# Patient Record
Sex: MALE | Race: WHITE | NOT HISPANIC OR LATINO | Employment: OTHER | URBAN - METROPOLITAN AREA
[De-identification: names, ages, dates, MRNs, and addresses within clinical notes are randomized per-mention and may not be internally consistent; named-entity substitution may affect disease eponyms.]

---

## 2023-04-19 ENCOUNTER — HOSPITAL ENCOUNTER (EMERGENCY)
Facility: CLINIC | Age: 73
Discharge: HOME | End: 2023-04-19
Attending: EMERGENCY MEDICINE | Admitting: EMERGENCY MEDICINE
Payer: MEDICARE

## 2023-04-19 ENCOUNTER — APPOINTMENT (OUTPATIENT)
Dept: MRI IMAGING | Facility: CLINIC | Age: 73
End: 2023-04-19
Attending: EMERGENCY MEDICINE
Payer: MEDICARE

## 2023-04-19 VITALS
DIASTOLIC BLOOD PRESSURE: 81 MMHG | RESPIRATION RATE: 16 BRPM | TEMPERATURE: 98.5 F | OXYGEN SATURATION: 97 % | HEART RATE: 82 BPM | SYSTOLIC BLOOD PRESSURE: 127 MMHG

## 2023-04-19 DIAGNOSIS — M54.50 ACUTE RIGHT-SIDED LOW BACK PAIN WITHOUT SCIATICA: ICD-10-CM

## 2023-04-19 LAB
ALBUMIN UR-MCNC: NEGATIVE MG/DL
ANION GAP SERPL CALCULATED.3IONS-SCNC: 7 MMOL/L (ref 7–15)
APPEARANCE UR: CLEAR
BASOPHILS # BLD AUTO: 0 10E3/UL (ref 0–0.2)
BASOPHILS NFR BLD AUTO: 0 %
BILIRUB UR QL STRIP: NEGATIVE
BUN SERPL-MCNC: 15.1 MG/DL (ref 8–23)
CALCIUM SERPL-MCNC: 8.7 MG/DL (ref 8.8–10.2)
CHLORIDE SERPL-SCNC: 103 MMOL/L (ref 98–107)
COLOR UR AUTO: ABNORMAL
CREAT SERPL-MCNC: 0.89 MG/DL (ref 0.67–1.17)
DEPRECATED HCO3 PLAS-SCNC: 29 MMOL/L (ref 22–29)
EOSINOPHIL # BLD AUTO: 0 10E3/UL (ref 0–0.7)
EOSINOPHIL NFR BLD AUTO: 0 %
ERYTHROCYTE [DISTWIDTH] IN BLOOD BY AUTOMATED COUNT: 13.4 % (ref 10–15)
GFR SERPL CREATININE-BSD FRML MDRD: >90 ML/MIN/1.73M2
GLUCOSE SERPL-MCNC: 111 MG/DL (ref 70–99)
GLUCOSE UR STRIP-MCNC: NEGATIVE MG/DL
HCT VFR BLD AUTO: 39.8 % (ref 40–53)
HGB BLD-MCNC: 13.8 G/DL (ref 13.3–17.7)
HGB UR QL STRIP: NEGATIVE
HOLD SPECIMEN: NORMAL
HOLD SPECIMEN: NORMAL
IMM GRANULOCYTES # BLD: 0 10E3/UL
IMM GRANULOCYTES NFR BLD: 0 %
KETONES UR STRIP-MCNC: NEGATIVE MG/DL
LEUKOCYTE ESTERASE UR QL STRIP: NEGATIVE
LYMPHOCYTES # BLD AUTO: 1.1 10E3/UL (ref 0.8–5.3)
LYMPHOCYTES NFR BLD AUTO: 23 %
MCH RBC QN AUTO: 35.6 PG (ref 26.5–33)
MCHC RBC AUTO-ENTMCNC: 34.7 G/DL (ref 31.5–36.5)
MCV RBC AUTO: 103 FL (ref 78–100)
MONOCYTES # BLD AUTO: 0.3 10E3/UL (ref 0–1.3)
MONOCYTES NFR BLD AUTO: 7 %
MUCOUS THREADS #/AREA URNS LPF: PRESENT /LPF
NEUTROPHILS # BLD AUTO: 3.4 10E3/UL (ref 1.6–8.3)
NEUTROPHILS NFR BLD AUTO: 70 %
NITRATE UR QL: NEGATIVE
NRBC # BLD AUTO: 0 10E3/UL
NRBC BLD AUTO-RTO: 0 /100
PH UR STRIP: 7.5 [PH] (ref 5–7)
PLATELET # BLD AUTO: 225 10E3/UL (ref 150–450)
POTASSIUM SERPL-SCNC: 4.6 MMOL/L (ref 3.4–5.3)
RBC # BLD AUTO: 3.88 10E6/UL (ref 4.4–5.9)
RBC URINE: 1 /HPF
SODIUM SERPL-SCNC: 139 MMOL/L (ref 136–145)
SP GR UR STRIP: 1.01 (ref 1–1.03)
UROBILINOGEN UR STRIP-MCNC: NORMAL MG/DL
WBC # BLD AUTO: 4.9 10E3/UL (ref 4–11)
WBC URINE: 0 /HPF

## 2023-04-19 PROCEDURE — 36415 COLL VENOUS BLD VENIPUNCTURE: CPT | Performed by: EMERGENCY MEDICINE

## 2023-04-19 PROCEDURE — 85025 COMPLETE CBC W/AUTO DIFF WBC: CPT | Performed by: EMERGENCY MEDICINE

## 2023-04-19 PROCEDURE — 250N000011 HC RX IP 250 OP 636: Performed by: EMERGENCY MEDICINE

## 2023-04-19 PROCEDURE — 80048 BASIC METABOLIC PNL TOTAL CA: CPT | Performed by: EMERGENCY MEDICINE

## 2023-04-19 PROCEDURE — 99285 EMERGENCY DEPT VISIT HI MDM: CPT | Mod: 25

## 2023-04-19 PROCEDURE — 96374 THER/PROPH/DIAG INJ IV PUSH: CPT

## 2023-04-19 PROCEDURE — G1010 CDSM STANSON: HCPCS

## 2023-04-19 PROCEDURE — 81001 URINALYSIS AUTO W/SCOPE: CPT | Performed by: EMERGENCY MEDICINE

## 2023-04-19 PROCEDURE — 250N000013 HC RX MED GY IP 250 OP 250 PS 637: Performed by: EMERGENCY MEDICINE

## 2023-04-19 RX ORDER — HYDROCODONE BITARTRATE AND ACETAMINOPHEN 5; 325 MG/1; MG/1
2 TABLET ORAL ONCE
Status: COMPLETED | OUTPATIENT
Start: 2023-04-19 | End: 2023-04-19

## 2023-04-19 RX ORDER — KETOROLAC TROMETHAMINE 15 MG/ML
10 INJECTION, SOLUTION INTRAMUSCULAR; INTRAVENOUS ONCE
Status: COMPLETED | OUTPATIENT
Start: 2023-04-19 | End: 2023-04-19

## 2023-04-19 RX ORDER — LIDOCAINE 50 MG/G
1 PATCH TOPICAL EVERY 24 HOURS
Qty: 10 PATCH | Refills: 0 | Status: SHIPPED | OUTPATIENT
Start: 2023-04-19 | End: 2023-04-29

## 2023-04-19 RX ORDER — CYCLOBENZAPRINE HCL 10 MG
10 TABLET ORAL 3 TIMES DAILY PRN
Qty: 20 TABLET | Refills: 0 | Status: SHIPPED | OUTPATIENT
Start: 2023-04-19

## 2023-04-19 RX ORDER — HYDROCODONE BITARTRATE AND ACETAMINOPHEN 5; 325 MG/1; MG/1
1 TABLET ORAL EVERY 6 HOURS PRN
Qty: 30 TABLET | Refills: 0 | Status: SHIPPED | OUTPATIENT
Start: 2023-04-19

## 2023-04-19 RX ADMIN — HYDROCODONE BITARTRATE AND ACETAMINOPHEN 2 TABLET: 5; 325 TABLET ORAL at 12:25

## 2023-04-19 RX ADMIN — KETOROLAC TROMETHAMINE 10 MG: 15 INJECTION, SOLUTION INTRAMUSCULAR; INTRAVENOUS at 14:38

## 2023-04-19 ASSESSMENT — ACTIVITIES OF DAILY LIVING (ADL)
ADLS_ACUITY_SCORE: 37

## 2023-04-19 ASSESSMENT — ENCOUNTER SYMPTOMS
FREQUENCY: 1
WEAKNESS: 0
SHORTNESS OF BREATH: 0
FEVER: 0
ABDOMINAL PAIN: 0
BACK PAIN: 1
NUMBNESS: 0
NAUSEA: 0
CHILLS: 0

## 2023-04-19 NOTE — ED TRIAGE NOTES
Patient presents with lower back pain that started yesterday after fishing, denies fall or trauma.  Patient is pain free when laying on his back but cannot get up without severe pain.  Pain has gotten progressively worse since yesterday.  No radiation, numbness or tingling.     Triage Assessment     Row Name 04/19/23 1123       Triage Assessment (Adult)    Airway WDL WDL       Respiratory WDL    Respiratory WDL WDL       Skin Circulation/Temperature WDL    Skin Circulation/Temperature WDL WDL       Cardiac WDL    Cardiac WDL WDL       Peripheral/Neurovascular WDL    Peripheral Neurovascular WDL WDL       Cognitive/Neuro/Behavioral WDL    Cognitive/Neuro/Behavioral WDL WDL

## 2023-04-19 NOTE — DISCHARGE INSTRUCTIONS
Return to the ER for worsening pain, numbness or weakness of the legs, or any new concerns.    Do not drive, use machinery, use alcohol, use other sedating medications, or use any medication that also contains acetaminophen (Tylenol) while taking Norco.    I would recommend an antiinflammatory like ibuprofen (don't take on an empty stomach).

## 2023-04-19 NOTE — ED PROVIDER NOTES
This 72 year old male patient was endorsed to me by Dr. Hand pending lumbar spine MRI for back pain.  He has received Toradol and Norco.    I have reviewed patient's vitals, labs, and notes which are unremarkable otherwise.    1645: MRI, as below, with degenerative changes resulting in moderate neural foraminal narrowings and mild to moderate spinal canal narrowing.  There is no indication for emergent neurosurgical consultation.  I spoke with Lico and his wife about results and answered their numerous questions.  I have recommended he use ibuprofen for anti-inflammatory effects.  Dr. Hand has already prescribed him Norco and I will add Flexeril.  He is very concerned about disc bulge and treatment thereof.  I will provide him with information for [facility] to speak with spine surgery there.  He understands this often improves with time but he can discuss other options such as [facility] or PT.  Lico was ambulatory without issue prior to discharge per nursing staff.    Lumbar spine MRI w/o contrast   Final Result   IMPRESSION:     1. Grade 1 anterolisthesis of L5 on S1 with chronic appearing L5 pars   defects. Associated degenerative disc changes at this level causes   moderate bilateral neural foraminal narrowings.    2. Additional degenerate changes at L1-2, L2-3, and L3-4 as detailed   above.   3. At L2-L3, there is mild to moderate spinal canal narrowing.      EFRAIN JOHNSON MD            SYSTEM ID:  [id number]               Clementina Becerra MD  04/19/23 [id number]

## 2023-04-19 NOTE — ED PROVIDER NOTES
"  History     Chief Complaint:  Back Pain       HPI   Lico Gonzales is a 72 year old male who presents for right low back pain.  The patient says he was sitting in a boat fishing yesterday.  When he stood up his back was markedly sore again difficulty walking.  Today he is not able to stand at all due to the pain.  He feels that his legs \"give out\" when the pain is particularly bad.  It is worse with changing position.  He is never had similar pain in the past.  No surgeries in the back.  The pain does not radiate into the legs.  No saddle anesthesia.  No weakness or paresthesias of the feet.  He does note increased urinary frequency since yesterday.  No fever.  No direct trauma.  Within the last week he was also helping his son take down a hockey rink and was breaking up some ice.      Review of External Notes: [facility] note reviewed from April 4, 2023.  The patient had an elevated PSA.    ROS:  Review of Systems   Constitutional: Negative for chills and fever.   Respiratory: Negative for shortness of breath.    Cardiovascular: Negative for chest pain.   Gastrointestinal: Negative for abdominal pain and nausea.   Genitourinary: Positive for frequency.   Musculoskeletal: Positive for back pain.   Neurological: Negative for weakness and numbness.   All other systems reviewed and are negative.      Allergies:  No Known Allergies     Medications:    HYDROcodone-acetaminophen (NORCO) 5-325 MG tablet  lidocaine (LIDODERM) 5 % patch  ELAVIL TABS 25 MG OR  LIPITOR TABS 10 MG OR  VIBRAMYCIN CAPS 100 MG OR        Past Medical History:    Past Medical History:   Diagnosis Date     Essential hypertension, benign      Mixed hyperlipidemia      Sleep disturbances        Past Surgical History:    Past Surgical History:   Procedure Laterality Date     EYE EXAM ESTABLISHED PT  1998     [facility] FEMUR/KNEE SURG UNLISTED      Upper Leg/Knee     HCL PSA, DIAGNOSTIC (TUMOR MARKER)      NEVER     REMOVAL OF SPERM DUCT(S)      Vasectomy "     SIGMOIDOSCOPY,DIAGNOSTIC      NEVER     [email or web address] HAND/FINGER SURGERY UNLISTED      Hand/Fingers        Family History:    family history is not on file.    Social History:   reports that he has never smoked. He does not have any smokeless tobacco history on file. He reports current alcohol use of about 1.7 standard drinks of alcohol per week. He reports that he does not use drugs.  PCP: No primary care provider on file.     Physical Exam     Patient Vitals for the past 24 hrs:   BP Temp Temp src Pulse Resp SpO2   04/19/23 1123 (!) 166/98 98.5  F (36.9  C) Oral 82 16 99 %        Physical Exam  Constitutional:       General: He is not in acute distress.     Appearance: Normal appearance. He is not toxic-appearing.   HENT:      Head: Atraumatic.      Nose: No congestion.   Eyes:      General: No scleral icterus.     Conjunctiva/sclera: Conjunctivae normal.   Cardiovascular:      Rate and Rhythm: Normal rate and regular rhythm.   Pulmonary:      Effort: Pulmonary effort is normal. No respiratory distress.   Abdominal:      General: Abdomen is flat. There is no distension.      Palpations: Abdomen is soft.   Musculoskeletal:      Cervical back: Neck supple. No tenderness.      Comments: No midline tenderness of the lumbar spine.  There is tenderness of the right lumbar paraspinal muscles.  No tenderness on the left.   Lymphadenopathy:      Cervical: No cervical adenopathy.   Skin:     General: Skin is warm.      Capillary Refill: Capillary refill takes less than 2 seconds.      Findings: No rash.   Neurological:      General: No focal deficit present.      Mental Status: He is alert and oriented to person, place, and time.      Comments: Strength and sensation intact in the lower extremities.  Sensation light touch intact over all distributions of the lower extremities.  Strength 5 out of 5 and equal bilaterally for dorsi and plantarflexion of the feet as well as for flexion at the hip.  Patellar reflexes 2+ and equal  bilaterally.   Psychiatric:         Mood and Affect: Mood normal.         Behavior: Behavior normal.           Emergency Department Course       Imaging:  Lumbar spine MRI w/o contrast    (Results Pending)      Report per radiology    Laboratory:  Labs Ordered and Resulted from Time of ED Arrival to Time of ED Departure   BASIC METABOLIC PANEL - Abnormal       Result Value    Sodium 139      Potassium 4.6      Chloride 103      Carbon Dioxide (CO2) 29      Anion Gap 7      Urea Nitrogen 15.1      Creatinine 0.89      Calcium 8.7 (*)     Glucose 111 (*)     GFR Estimate >90     ROUTINE UA WITH MICROSCOPIC REFLEX TO CULTURE - Abnormal    Color Urine Straw      Appearance Urine Clear      Glucose Urine Negative      Bilirubin Urine Negative      Ketones Urine Negative      Specific Gravity Urine 1.009      Blood Urine Negative      pH Urine 7.5 (*)     Protein Albumin Urine Negative      Urobilinogen Urine Normal      Nitrite Urine Negative      Leukocyte Esterase Urine Negative      Mucus Urine Present (*)     RBC Urine 1      WBC Urine 0     CBC WITH PLATELETS AND DIFFERENTIAL - Abnormal    WBC Count 4.9      RBC Count 3.88 (*)     Hemoglobin 13.8      Hematocrit 39.8 (*)      (*)     MCH 35.6 (*)     MCHC 34.7      RDW 13.4      Platelet Count 225      % Neutrophils 70      % Lymphocytes 23      % Monocytes 7      % Eosinophils 0      % Basophils 0      % Immature Granulocytes 0      NRBCs per 100 WBC 0      Absolute Neutrophils 3.4      Absolute Lymphocytes 1.1      Absolute Monocytes 0.3      Absolute Eosinophils 0.0      Absolute Basophils 0.0      Absolute Immature Granulocytes 0.0      Absolute NRBCs 0.0          Emergency Department Course & Assessments:             Interventions:  Medications   ketorolac (TORADOL) injection 10 mg (has no administration in time range)   HYDROcodone-acetaminophen (NORCO) 5-325 MG per tablet 2 tablet (2 tablets Oral $Given 4/19/23 1225)          Impression & Plan       Medical Decision Making:  This patient is a 72-year-old man who presents to the emergency department with low back pain.  His symptoms are most consistent with a musculoskeletal strain.  However, given his age and the intermittent sensation of weakness and with a increased urinary frequency he should have an MRI especially with a history of an elevated PSA.    If the MRI is negative for cord compression or significant nerve root compression then he would be appropriate for ongoing management for musculoskeletal pain.      Diagnosis:    ICD-10-CM    1. Acute right-sided low back pain without sciatica  M54.50            Discharge Medications:  New Prescriptions    HYDROCODONE-ACETAMINOPHEN (NORCO) 5-325 MG TABLET    Take 1 tablet by mouth every 6 hours as needed for pain    LIDOCAINE (LIDODERM) 5 % PATCH    Place 1 patch onto the skin every 24 hours for 10 days            4/19/2023   Seun Hand MD McRoberts, Sean Edward, MD  04/19/23 7771

## 2024-10-12 ENCOUNTER — HEALTH MAINTENANCE LETTER (OUTPATIENT)
Age: 74
End: 2024-10-12